# Patient Record
Sex: MALE | Race: WHITE | Employment: FULL TIME | URBAN - METROPOLITAN AREA
[De-identification: names, ages, dates, MRNs, and addresses within clinical notes are randomized per-mention and may not be internally consistent; named-entity substitution may affect disease eponyms.]

---

## 2020-05-31 ENCOUNTER — HOSPITAL ENCOUNTER (EMERGENCY)
Facility: HOSPITAL | Age: 30
Discharge: HOME | End: 2020-05-31
Attending: EMERGENCY MEDICINE
Payer: COMMERCIAL

## 2020-05-31 VITALS
SYSTOLIC BLOOD PRESSURE: 141 MMHG | OXYGEN SATURATION: 98 % | TEMPERATURE: 100 F | HEART RATE: 59 BPM | RESPIRATION RATE: 16 BRPM | WEIGHT: 135 LBS | DIASTOLIC BLOOD PRESSURE: 95 MMHG

## 2020-05-31 DIAGNOSIS — R11.2 NAUSEA AND VOMITING, INTRACTABILITY OF VOMITING NOT SPECIFIED, UNSPECIFIED VOMITING TYPE: Primary | ICD-10-CM

## 2020-05-31 PROCEDURE — 96375 TX/PRO/DX INJ NEW DRUG ADDON: CPT

## 2020-05-31 PROCEDURE — 99284 EMERGENCY DEPT VISIT MOD MDM: CPT

## 2020-05-31 PROCEDURE — 96374 THER/PROPH/DIAG INJ IV PUSH: CPT

## 2020-05-31 PROCEDURE — 96361 HYDRATE IV INFUSION ADD-ON: CPT

## 2020-05-31 PROCEDURE — 81001 URINALYSIS AUTO W/SCOPE: CPT | Performed by: EMERGENCY MEDICINE

## 2020-05-31 PROCEDURE — 80053 COMPREHEN METABOLIC PANEL: CPT | Performed by: EMERGENCY MEDICINE

## 2020-05-31 PROCEDURE — 85025 COMPLETE CBC W/AUTO DIFF WBC: CPT | Performed by: EMERGENCY MEDICINE

## 2020-05-31 PROCEDURE — 83690 ASSAY OF LIPASE: CPT | Performed by: EMERGENCY MEDICINE

## 2020-05-31 RX ORDER — ONDANSETRON 2 MG/ML
4 INJECTION INTRAMUSCULAR; INTRAVENOUS ONCE
Status: COMPLETED | OUTPATIENT
Start: 2020-05-31 | End: 2020-05-31

## 2020-05-31 RX ORDER — KETOROLAC TROMETHAMINE 30 MG/ML
30 INJECTION, SOLUTION INTRAMUSCULAR; INTRAVENOUS ONCE
Status: COMPLETED | OUTPATIENT
Start: 2020-05-31 | End: 2020-05-31

## 2020-05-31 RX ORDER — FAMOTIDINE 20 MG/1
20 TABLET ORAL ONCE
Status: COMPLETED | OUTPATIENT
Start: 2020-05-31 | End: 2020-05-31

## 2020-05-31 RX ORDER — ONDANSETRON 4 MG/1
4 TABLET, ORALLY DISINTEGRATING ORAL EVERY 4 HOURS PRN
Qty: 10 TABLET | Refills: 0 | Status: SHIPPED | OUTPATIENT
Start: 2020-05-31 | End: 2020-06-04 | Stop reason: ALTCHOICE

## 2020-05-31 NOTE — ED PROVIDER NOTES
Patient Seen in: [facility] Emergency Department      History   Patient presents with:  Nausea/Vomiting/Diarrhea    Stated Complaint: Fruit[place] Guzman    HPI     This is a very pleasant 31year old male who presents with vomiting.   Patient reports t Labs Reviewed   COMP METABOLIC PANEL (14) - Abnormal; Notable for the following components:       Result Value    Glucose 101 (*)     Potassium 3.4 (*)     BUN/CREA Ratio 20.5 (*)     Total Protein 8.3 (*)     All other components within normal limits diagnosis)    Disposition:  Discharge  5/31/2020  4:04 am    Follow-up:  Sally Hwang, [address]  [phone]    In 1 week  As needed          Medications Prescribed:  Current Discharge Medication List    START Jez Amaya

## 2020-05-31 NOTE — ED INITIAL ASSESSMENT (HPI)
Patient here with c/o N/V that started yesterday afternoon on the way too work. Patient vomited 3 times. Denies fever. Patient having generalized body aches.   Patient reports last time he vomited it was read

## 2020-06-04 NOTE — PROGRESS NOTES
Marcelino Rodriguez  3/25/1990    Patient presents with:  [facility]  Wellness Visit      SUBJECTIVE   Marcelino Rodriguez is a 31year old male who presents with concerns of anxiety.     The patient has longstanding history of anxiety for which he follows with a thera Yes      Types: Cannabis        OBJECTIVE:   /76 (BP Location: Right arm, Patient Position: Sitting, Cuff Size: adult)   Pulse 76   Temp 96.9 °F (36.1 °C) (Temporal)   Resp 16   Ht 70.47\"   Wt 132 lb (59.9 kg)   BMI 18.69 kg/m²   Constitutional: Willie Thank you,  Dorothea Fortune MD

## 2020-06-29 RX ORDER — SERTRALINE HYDROCHLORIDE 25 MG/1
TABLET, FILM COATED ORAL
Qty: 30 TABLET | Refills: 0 | Status: SHIPPED | OUTPATIENT
Start: 2020-06-29 | End: 2020-07-28

## 2020-06-29 NOTE — TELEPHONE ENCOUNTER
Last OV: 6/4/20 wellness    No future appointments. Latest labs: no recent labs on file    Latest RX: SERTRALINE HCL 25 MG TABLET 30 tabs 0 refills on 6/4/20    Per protocol, not on protocol. Rx pending.

## 2020-07-28 RX ORDER — SERTRALINE HYDROCHLORIDE 25 MG/1
25 TABLET, FILM COATED ORAL DAILY
Qty: 30 TABLET | Refills: 0 | Status: SHIPPED | OUTPATIENT
Start: 2020-07-28 | End: 2020-12-28

## 2020-07-28 NOTE — TELEPHONE ENCOUNTER
Last Ov: 6/4/20, AD, CPE  Last labs: UA, w Ref, Lipase, CMP, CBC 5/31/20  Last Rx: sertraline 25mg, #30, 0R 6/29/20    No future appointments. Per Protocol - not on protocol. Rx pending.

## 2020-12-28 NOTE — PROGRESS NOTES
Miguel A Ibanez  3/25/1990    Patient presents with: Follow - Up: RG rm 6 f/u anxiety      SUBJECTIVE   Miguel A Ibanez is a 31year old male who presents as a follow-up.     The patient has a long-standing history of anxiety for which he was recently initiated on and dry. No rash. Psychiatric: Normal mood and affect. Maintains good eye contact. Has good insight and judgement. ASSESSMENT AND PLAN:   Baljeet Hua is a 31year old male who presents as a follow-up.     Anxiety:  Discontinue sertraline  Initiate lexap

## 2021-01-21 RX ORDER — ESCITALOPRAM OXALATE 5 MG/1
5 TABLET ORAL DAILY
Qty: 90 TABLET | Refills: 0 | Status: SHIPPED | OUTPATIENT
Start: 2021-01-21 | End: 2022-02-28

## 2021-01-21 NOTE — TELEPHONE ENCOUNTER
Last VISIT 12/28/20    Last REFILL 12/28/20 qty 30 w/0 refills    Last LABS No recent labs done    No future appointments. Per PROTOCOL? Not on protocol      Please Approve or Deny.

## 2021-04-28 ENCOUNTER — TELEPHONE (OUTPATIENT)
Dept: INTERNAL MEDICINE CLINIC | Facility: CLINIC | Age: 31
End: 2021-04-28

## 2021-04-28 NOTE — TELEPHONE ENCOUNTER
Pt calling, had spilled hot water on arm at work a fews day ago. Still red and scaly, wondering if he should be putting something on it?

## 2021-07-02 ENCOUNTER — HOSPITAL ENCOUNTER (EMERGENCY)
Facility: HOSPITAL | Age: 31
Discharge: HOME | End: 2021-07-02
Attending: STUDENT IN AN ORGANIZED HEALTH CARE EDUCATION/TRAINING PROGRAM
Payer: COMMERCIAL

## 2021-07-02 VITALS
RESPIRATION RATE: 20 BRPM | HEIGHT: 71 IN | BODY MASS INDEX: 18.9 KG/M2 | HEART RATE: 71 BPM | WEIGHT: 135 LBS | SYSTOLIC BLOOD PRESSURE: 144 MMHG | DIASTOLIC BLOOD PRESSURE: 102 MMHG | OXYGEN SATURATION: 99 %

## 2021-07-02 DIAGNOSIS — F41.9 ANXIETY: ICD-10-CM

## 2021-07-02 DIAGNOSIS — F32.A DEPRESSION, UNSPECIFIED DEPRESSION TYPE: Primary | ICD-10-CM

## 2021-07-02 LAB
AMPHET UR QL SCN: NEGATIVE
BENZODIAZ UR QL SCN: NEGATIVE
COCAINE UR QL: NEGATIVE
CREAT UR-SCNC: 351 MG/DL
MDMA UR QL SCN: NEGATIVE
OPIATES UR QL SCN: NEGATIVE
OXYCODONE UR QL SCN: NEGATIVE

## 2021-07-02 PROCEDURE — 99284 EMERGENCY DEPT VISIT MOD MDM: CPT

## 2021-07-02 PROCEDURE — 80307 DRUG TEST PRSMV CHEM ANLYZR: CPT | Performed by: STUDENT IN AN ORGANIZED HEALTH CARE EDUCATION/TRAINING PROGRAM

## 2021-07-02 NOTE — ED PROVIDER NOTES
Patient Seen in: [facility] Emergency Department      History   Patient presents with:  Eval-P    Stated Complaint: Eval-p    HPI/Subjective:   HPI    Patient is a 41-year-old male presenting to the emergency department via EMS after he had texted h Exam    Constitutional: oriented to person, place, and time, appears well-developed and well-nourished. HENT:   Head: Normocephalic and atraumatic. Eyes: No scleral icterus. Neck: Normal range of motion. Neck supple.    Cardiovascular: Normal rate, reg encounter diagnosis)  Anxiety     Disposition:  Discharge  7/2/2021  3:27 pm    Follow-up:  Nicolette Jones MD  [address] [address] [phone]                Medications Prescribed:  Current Discharge Medication List

## 2021-07-02 NOTE — ED INITIAL ASSESSMENT (HPI)
Text exchage with ex and made some si statements reportedly to get a rise out of her. PD was called and EMS brought him in due to the statements made, history of depression and looking for support services and maybe trying medications again as well.

## 2021-08-10 ENCOUNTER — TELEPHONE (OUTPATIENT)
Dept: INTERNAL MEDICINE CLINIC | Facility: CLINIC | Age: 31
End: 2021-08-10

## 2021-08-10 NOTE — TELEPHONE ENCOUNTER
Pt calling to see AD for a sore throat. States he was tested for covid and it was negative. I advised AD had no appointments left for today and he started crying on the phone. Seems he has missed some appt's regarding anxiety/depression.  Please call him an

## 2021-08-10 NOTE — TELEPHONE ENCOUNTER
Patient states he has ST, red bumps on the back of his throat, head congestion, was tested for COVID at [facility](?). Pt states he is at [id number] [facility] [address] but they will not let him in without negative COVID results. Pt very emotional and crying.

## 2022-02-28 ENCOUNTER — TELEPHONE (OUTPATIENT)
Dept: INTERNAL MEDICINE CLINIC | Facility: CLINIC | Age: 32
End: 2022-02-28

## 2022-02-28 PROBLEM — F41.0 PANIC ANXIETY SYNDROME: Status: ACTIVE | Noted: 2022-02-28

## 2022-02-28 PROBLEM — F32.A DEPRESSION: Status: ACTIVE | Noted: 2022-02-28

## 2022-02-28 NOTE — TELEPHONE ENCOUNTER
Future Appointments   Date Time Provider Marky Emma   3/14/2022 10:40 AM Shahab Borges MD [facility] [address] [facility]     Pt sched himself for \"Anxiety out of control, anger, depression, and need echo card. For [place]. \" please triage if ok to wait until 3/14

## 2022-02-28 NOTE — TELEPHONE ENCOUNTER
Spoke to pt. Pt said that he has been experiencing a lot of anxiety and depression and would like to speak with AD further regarding this. Pt seen by AD 12/25/2020 for anxiety. Scheduled pt for 40 min appt today at 4:00. Pt would prefer in office visit. Travel screening completed. AD, ok for appt?

## 2022-03-04 RX ORDER — SERTRALINE HYDROCHLORIDE 25 MG/1
25 TABLET, FILM COATED ORAL DAILY
Qty: 90 TABLET | Refills: 0 | Status: SHIPPED | OUTPATIENT
Start: 2022-03-04

## 2022-03-04 NOTE — TELEPHONE ENCOUNTER
Pt stated his ins will not cover a 30 day needs to be sent as a 90 day. sertraline 25 MG Oral Tab 30 tablet 0 2/28/2022     Sig - Route:  Take 1 tablet (25 mg total) by mouth daily. - Oral    Sent to pharmacy as: Sertraline HCl 25 MG Oral Tablet (ZOLOFT)      [facility] on file

## 2022-03-04 NOTE — TELEPHONE ENCOUNTER
Last VISIT 02/28/22    Last CPE Not on file     Last REFILL 02/28/22 qty 30 w/0 refills    Last LABS 07/02/21 UA done    No future appointments. Per PROTOCOL? Not on protocol      Please Approve or Deny.

## 2022-06-03 RX ORDER — SERTRALINE HYDROCHLORIDE 25 MG/1
25 TABLET, FILM COATED ORAL DAILY
Qty: 90 TABLET | Refills: 0 | Status: SHIPPED | OUTPATIENT
Start: 2022-06-03

## 2022-06-03 NOTE — TELEPHONE ENCOUNTER
Last OV 2.28.22 w/ AD (anxiety)   Last PE No recent PE on file   Last REFILL 3.4.22 Sertraline 25mg #90 0R  Last LABS No recent labs within last 12 months     No future appointments. Per PROTOCOL?  Not on protocol     Please Advise

## 2022-12-06 ENCOUNTER — TELEPHONE (OUTPATIENT)
Dept: INTERNAL MEDICINE CLINIC | Facility: CLINIC | Age: 32
End: 2022-12-06

## 2022-12-06 NOTE — TELEPHONE ENCOUNTER
Mother is calling to inform AD she is sending a message via [employer] with the exact request of this letter for emotional support. The letter he wrote back on Nov. is insufficient.

## 2022-12-06 NOTE — TELEPHONE ENCOUNTER
Per Zahraa's  message, \"Dr. Vargas Moser, Sorry to bother you . Tianna Grayson My condo board stated they need Severo Carwin medical condition needs to be on the letter that constitutes a disability (per [address] [facility] act) ,  and it has to state that Ariana Sethi has a  therapeutic relationship with you Dr. Vargas Moser as required under the [employer]. Can you do that for us? I also called in the question to nurse, they will call me back. [phone]  Thanks his Katey Jimenes 10/07/1961\"      AD, ok for updated letter?

## 2022-12-07 NOTE — TELEPHONE ENCOUNTER
AD, please write letter. I do not see any previous letters and am not sure of further details needed in this letter or which medical conditions are necessary to include.

## 2022-12-07 NOTE — TELEPHONE ENCOUNTER
It's under the letters tab dated 11/15/22. I can reprint tomorrow while in office, if needed. Should be available for patient in his MyChart.

## 2022-12-12 NOTE — TELEPHONE ENCOUNTER
Pt is calling stating that the letter needs more information and he doesn't want his diagnosis listed. He will be uploading a copy of what is specifically needed through my chart so a new letter can be written.

## 2022-12-12 NOTE — TELEPHONE ENCOUNTER
Now patient does not want diagnosis. Please see attachment and advise if wording appropriate wording.

## 2023-02-16 PROBLEM — F41.9 ANXIETY: Status: ACTIVE | Noted: 2023-02-16
